# Patient Record
Sex: MALE | Race: WHITE | NOT HISPANIC OR LATINO | Employment: UNEMPLOYED | URBAN - METROPOLITAN AREA
[De-identification: names, ages, dates, MRNs, and addresses within clinical notes are randomized per-mention and may not be internally consistent; named-entity substitution may affect disease eponyms.]

---

## 2019-02-13 ENCOUNTER — HOSPITAL ENCOUNTER (EMERGENCY)
Facility: OTHER | Age: 39
Discharge: HOME | End: 2019-02-14
Attending: EMERGENCY MEDICINE

## 2019-02-13 DIAGNOSIS — R20.8 SENSATION OF CHANGE IN TEMPERATURE: ICD-10-CM

## 2019-02-13 DIAGNOSIS — R05.9 COUGH: ICD-10-CM

## 2019-02-13 DIAGNOSIS — J18.9 PNEUMONIA OF LEFT LOWER LOBE DUE TO INFECTIOUS ORGANISM: Primary | ICD-10-CM

## 2019-02-13 DIAGNOSIS — M79.89 LEG SWELLING: ICD-10-CM

## 2019-02-13 PROCEDURE — 99285 EMERGENCY DEPT VISIT HI MDM: CPT | Mod: 25

## 2019-02-13 RX ORDER — ACETAMINOPHEN 500 MG
1000 TABLET ORAL
Status: DISCONTINUED | OUTPATIENT
Start: 2019-02-14 | End: 2019-02-14 | Stop reason: HOSPADM

## 2019-02-14 VITALS
BODY MASS INDEX: 41.75 KG/M2 | SYSTOLIC BLOOD PRESSURE: 102 MMHG | DIASTOLIC BLOOD PRESSURE: 62 MMHG | HEART RATE: 114 BPM | HEIGHT: 73 IN | WEIGHT: 315 LBS | OXYGEN SATURATION: 99 % | TEMPERATURE: 100 F | RESPIRATION RATE: 18 BRPM

## 2019-02-14 LAB
ALBUMIN SERPL BCP-MCNC: 3.1 G/DL
ALP SERPL-CCNC: 69 U/L
ALT SERPL W/O P-5'-P-CCNC: 11 U/L
ANION GAP SERPL CALC-SCNC: 10 MMOL/L
APTT BLDCRRT: 36.7 SEC
AST SERPL-CCNC: 18 U/L
BASOPHILS # BLD AUTO: 0.02 K/UL
BASOPHILS NFR BLD: 0.2 %
BILIRUB SERPL-MCNC: 1.7 MG/DL
BNP SERPL-MCNC: 16 PG/ML
BUN SERPL-MCNC: 11 MG/DL
CALCIUM SERPL-MCNC: 9.9 MG/DL
CHLORIDE SERPL-SCNC: 98 MMOL/L
CO2 SERPL-SCNC: 26 MMOL/L
CREAT SERPL-MCNC: 1 MG/DL
DIFFERENTIAL METHOD: ABNORMAL
EOSINOPHIL # BLD AUTO: 0 K/UL
EOSINOPHIL NFR BLD: 0.2 %
ERYTHROCYTE [DISTWIDTH] IN BLOOD BY AUTOMATED COUNT: 13.3 %
EST. GFR  (AFRICAN AMERICAN): >60 ML/MIN/1.73 M^2
EST. GFR  (NON AFRICAN AMERICAN): >60 ML/MIN/1.73 M^2
GLUCOSE SERPL-MCNC: 101 MG/DL
HCT VFR BLD AUTO: 41.8 %
HGB BLD-MCNC: 14.2 G/DL
INR PPP: 0.9
LYMPHOCYTES # BLD AUTO: 1.7 K/UL
LYMPHOCYTES NFR BLD: 15 %
MCH RBC QN AUTO: 29.4 PG
MCHC RBC AUTO-ENTMCNC: 34 G/DL
MCV RBC AUTO: 87 FL
MONOCYTES # BLD AUTO: 1.2 K/UL
MONOCYTES NFR BLD: 10.5 %
NEUTROPHILS # BLD AUTO: 8.5 K/UL
NEUTROPHILS NFR BLD: 73.8 %
PLATELET # BLD AUTO: 330 K/UL
PMV BLD AUTO: 9.8 FL
POTASSIUM SERPL-SCNC: 4.1 MMOL/L
PROT SERPL-MCNC: 8.7 G/DL
PROTHROMBIN TIME: 10.5 SEC
RBC # BLD AUTO: 4.83 M/UL
SODIUM SERPL-SCNC: 134 MMOL/L
WBC # BLD AUTO: 11.47 K/UL

## 2019-02-14 PROCEDURE — 85610 PROTHROMBIN TIME: CPT

## 2019-02-14 PROCEDURE — 63600175 PHARM REV CODE 636 W HCPCS: Performed by: PHYSICIAN ASSISTANT

## 2019-02-14 PROCEDURE — 85025 COMPLETE CBC W/AUTO DIFF WBC: CPT

## 2019-02-14 PROCEDURE — 25000003 PHARM REV CODE 250: Performed by: EMERGENCY MEDICINE

## 2019-02-14 PROCEDURE — 83880 ASSAY OF NATRIURETIC PEPTIDE: CPT

## 2019-02-14 PROCEDURE — 80053 COMPREHEN METABOLIC PANEL: CPT

## 2019-02-14 PROCEDURE — 85730 THROMBOPLASTIN TIME PARTIAL: CPT

## 2019-02-14 RX ORDER — BENZONATATE 100 MG/1
100 CAPSULE ORAL 3 TIMES DAILY PRN
Qty: 15 CAPSULE | Refills: 0 | Status: SHIPPED | OUTPATIENT
Start: 2019-02-14 | End: 2019-02-24

## 2019-02-14 RX ORDER — NAPROXEN 500 MG/1
500 TABLET ORAL
Status: COMPLETED | OUTPATIENT
Start: 2019-02-14 | End: 2019-02-14

## 2019-02-14 RX ORDER — SODIUM CHLORIDE 9 MG/ML
1000 INJECTION, SOLUTION INTRAVENOUS
Status: DISCONTINUED | OUTPATIENT
Start: 2019-02-14 | End: 2019-02-14 | Stop reason: HOSPADM

## 2019-02-14 RX ORDER — NAPROXEN 500 MG/1
500 TABLET ORAL 2 TIMES DAILY PRN
Qty: 60 TABLET | Refills: 0 | Status: SHIPPED | OUTPATIENT
Start: 2019-02-14

## 2019-02-14 RX ORDER — LEVOFLOXACIN 750 MG/1
750 TABLET ORAL
Status: COMPLETED | OUTPATIENT
Start: 2019-02-14 | End: 2019-02-14

## 2019-02-14 RX ORDER — LEVOFLOXACIN 750 MG/1
750 TABLET ORAL DAILY
Qty: 4 TABLET | Refills: 0 | Status: SHIPPED | OUTPATIENT
Start: 2019-02-14

## 2019-02-14 RX ADMIN — NAPROXEN 500 MG: 500 TABLET ORAL at 04:02

## 2019-02-14 RX ADMIN — LEVOFLOXACIN 750 MG: 750 TABLET, FILM COATED ORAL at 02:02

## 2019-02-14 NOTE — ED NOTES
"Pt presented to ED via [facility] with complaints of productive cough with bilateral lower extremity swelling/pain for "several weeks" pta. Pt is currently homeless and is wheelchair bound secondary to poor peripheral circulation to lower extremities. On arrival pt appears calm and cooperative RR easy non laboerd, NAD. AAOx4, VSS with mild tachycardia noted on monitor. Continuous blood pressure and pulse ox applied at this time. Negative further complaints at this time.    "

## 2019-02-14 NOTE — ED NOTES
"Pt becomes upset during discharge stating "man yall cant throw me out I would never make it out there without a cab man". Pt request to speak to House supervisor at this time. Charge nurse aware   "

## 2019-02-14 NOTE — ED NOTES
Unable to establish IV access via multiple attempts/multiple RN's. Pt refuses any further attempts at this time. MD aware of current status

## 2019-02-14 NOTE — ED NOTES
Pt resting in recliner without distress, RR easy non labored, NAD. AAOx4, VSS. Negative further complaints at this time. Will continue to monitor and assess for changes.

## 2019-02-14 NOTE — ED NOTES
Rounding on the patient has been done. The patient has been updated on the plan of care and current status. Pain was assessed and is currently a 8/10. Comfort positioning and restroom needs were addressed. Necessary items were placed with in reach and was advised when a reassessment would take place. The call bell remains at the bedside for any additional patient needs. The patient is resting comfortably on the stretcher, respirations are even and unlabored, skin warm and dry. Will continue to monitor.

## 2019-02-14 NOTE — ED NOTES
Pt resting in recliner comfortably, RR easy non labored, NAD. AAOx4, VSS, negative further complaints at this time, will continue to monitor and assess for changes.

## 2019-02-14 NOTE — ED PROVIDER NOTES
Encounter Date: 2/13/2019    SCRIBE #1 NOTE: I, Jenifer Kline, am scribing for, and in the presence of, Dr. Archer.       History     Chief Complaint   Patient presents with    Cough     productive cough for several weeks and L shoulder pain.      Patient is a 39-year-old male with history of hepatitis-C, bronchitis, atrial fibrillation, a.m. poor arterial circulation who is wheelchair-bound (secondary to remote spinal abscess) who presents to the ED via EMS with cough and leg swelling.  Patient reports a productive cough with yellow sputum for the past 2-3 weeks.  He also reports shortness of breath but denies chest pain. He denies fever, chills, or night sweats. Patient also reports lower extremity swelling for the past several weeks.  Patient states he has a history of swelling but it has worsened.  Patient states he has a history of DVT to his left lower extremity in history of cellulitis to bilateral lower extremities. He states he has poor hygiene and is homeless.  He states he has not worn shoes in a while.  He also reports source to his feet.      The history is provided by the patient.     Review of patient's allergies indicates:  No Known Allergies  Past Medical History:   Diagnosis Date    A-fib     Bronchitis     Hepatitis C     Poor peripheral circulation      History reviewed. No pertinent surgical history.  History reviewed. No pertinent family history.  Social History     Tobacco Use    Smoking status: Current Every Day Smoker   Substance Use Topics    Alcohol use: Yes    Drug use: Yes     Types: Methamphetamines     Review of Systems   Constitutional: Negative for chills and fever.   HENT: Negative for congestion and sore throat.    Eyes: Negative for pain.   Respiratory: Positive for cough and shortness of breath.    Cardiovascular: Positive for leg swelling. Negative for chest pain.   Gastrointestinal: Negative for abdominal pain, diarrhea, nausea and vomiting.   Genitourinary: Negative for  dysuria.   Musculoskeletal: Negative for arthralgias.   Skin: Positive for wound (To bilateral feet). Negative for rash.   Neurological: Negative for headaches.       Physical Exam     Initial Vitals [02/13/19 2206]   BP Pulse Resp Temp SpO2   (!) 144/91 (!) 112 18 99.7 °F (37.6 °C) 97 %      MAP       --         Physical Exam    Constitutional: Vital signs are normal. He is cooperative.   Patient is wheelchair-bound who is disheveled and has poor hygiene.   HENT:   Head: Normocephalic and atraumatic.   Eyes: EOM are normal. Pupils are equal, round, and reactive to light.   Neck: Normal range of motion. Neck supple.   Cardiovascular: Normal rate and regular rhythm.   Pedal pulses not palpable.   Pulmonary/Chest: Breath sounds normal. He has no wheezes. He has no rhonchi. He has no rales.   Dry cough noted on exam   Abdominal: Soft. Bowel sounds are normal. There is no tenderness.   Musculoskeletal:   Edematous upper and lower extremities. No pitting edema noted.   Right foot is cool to touch.  Left foot has erythema and warmth near the base of the left great toe.   Neurological: He is alert and oriented to person, place, and time. GCS eye subscore is 4. GCS verbal subscore is 5. GCS motor subscore is 6.   Skin: Skin is warm and dry. Capillary refill takes less than 2 seconds.   There are hyperpigmented sores to the dorsal aspect of bilateral feet that appear chronic.  Bilateral feet are dry.         ED Course   Procedures  Labs Reviewed   CBC W/ AUTO DIFFERENTIAL - Abnormal; Notable for the following components:       Result Value    Gran # (ANC) 8.5 (*)     Mono # 1.2 (*)     Gran% 73.8 (*)     Lymph% 15.0 (*)     All other components within normal limits   COMPREHENSIVE METABOLIC PANEL - Abnormal; Notable for the following components:    Sodium 134 (*)     Total Protein 8.7 (*)     Albumin 3.1 (*)     Total Bilirubin 1.7 (*)     All other components within normal limits   APTT - Abnormal; Notable for the following  components:    aPTT 36.7 (*)     All other components within normal limits   PROTIME-INR   B-TYPE NATRIURETIC PEPTIDE   LACTIC ACID, PLASMA          Imaging Results          US Lower Extremity Arteries Bilateral (Final result)  Result time 02/14/19 01:41:33    Final result by Judi Hernandez MD (02/14/19 01:41:33)                 Impression:      Nonspecific monophasic waveforms seen throughout the bilateral lower extremities.  No evidence of hemodynamically significant arterial stenosis or occlusion.      Electronically signed by: Judi Hernandez MD  Date:    02/14/2019  Time:    01:41             Narrative:    EXAMINATION:  US LOWER EXTREMITY ARTERIES BILATERAL    CLINICAL HISTORY:  Other general symptoms and signs    TECHNIQUE:  Bilateral ankle-brachial indices as well as bilateral spectral, color and grayscale images of the large arteries of both lower extremities were performed.    COMPARISON:  None    FINDINGS:  Right lower extremity:    Common femoral artery: 202 cm/sec, triphasic    SFA proximal: 199 cm/sec, monophasic    SFA mid: 200 cm/sec, monophasic    SFA distal: 230 cm/sec, monophasic    Popliteal artery: 75 cm/sec, monophasic    Peroneal artery: 110 cm/sec    Posterior tibial artery: 78 cm/sec    Anterior tibial artery: 132 cm/sec    Dorsalis pedis artery: 149 cm/sec    Left lower extremity:    Common femoral artery: 121 cm/sec, triphasic    SFA proximal: 161 cm/sec, monophasic    SFA mid: 178 cm/sec, monophasic    SFA distal: 167 cm/sec, monophasic    Popliteal artery: 100 cm/sec, monophasic    Peroneal artery: 93 cm/sec    Posterior tibial artery: 47 cm/sec    Anterior tibial artery: 75 cm/sec    Dorsalis pedis artery: 121 cm/sec                               US Lower Extremity Veins Bilateral (Final result)  Result time 02/14/19 01:28:05    Final result by Judi Hernandez MD (02/14/19 01:28:05)                 Impression:      No evidence of lower extremity deep venous  thrombosis.      Electronically signed by: Judi Hernandez MD  Date:    02/14/2019  Time:    01:28             Narrative:    EXAMINATION:  US LOWER EXTREMITY VEINS BILATERAL    CLINICAL HISTORY:  Other specified soft tissue disorders    TECHNIQUE:  Duplex and color flow Doppler evaluation of the bilateral lower extremity veins was performed.    COMPARISON:  None    FINDINGS:  No evidence of clot involving the bilateral common femoral, greater saphenous, femoral, popliteal, peroneal, anterior and posterior tibial veins.  All venous structures demonstrate normal respiratory phasicity and augment adequately.  No evidence of soft tissue mass or Baker's cyst.                               X-Ray Chest PA And Lateral (Final result)  Result time 02/14/19 00:32:27   Procedure changed from X-Ray Chest 1 View     Final result by Judi Hernandez MD (02/14/19 00:32:27)                 Impression:      Left lower lobe consolidation, suspected pneumonia.  Future radiographic follow-up is recommended to ensure resolution.      Electronically signed by: Judi Hernandez MD  Date:    02/14/2019  Time:    00:32             Narrative:    EXAMINATION:  XR CHEST PA AND LATERAL    CLINICAL HISTORY:  cough; Cough    TECHNIQUE:  PA and lateral views of the chest were performed.    COMPARISON:  23:23.    FINDINGS:  Heart is normal in size.  Lungs are symmetrically expanded.  Consolidative changes are seen at the left lung base.  No evidence of pneumothorax or significant effusion.  No acute osseous abnormality identified.                               X-Ray Chest 1 View (Final result)  Result time 02/13/19 23:43:52   Procedure changed from X-Ray Chest PA And Lateral     Final result by Judi Hernandez MD (02/13/19 23:43:52)                 Impression:      Consolidative changes seen within the left lower lung zone, possible pneumonia.  Future follow-up is recommended to ensure resolution.    Questionable pneumothorax versus overlying skin  fold seen in the left apical region.  Suboptimal portable technique.  Recommend chest PA and lateral versus CT chest if unable to be performed.      Electronically signed by: Judi Hernandez MD  Date:    02/13/2019  Time:    23:43             Narrative:    EXAMINATION:  XR CHEST 1 VIEW    CLINICAL HISTORY:  cough; Cough    TECHNIQUE:  Single frontal view of the chest was performed.    COMPARISON:  None    FINDINGS:  Examination limited by relative underpenetration and portable technique.  Consolidative changes are seen within the left lower lung zone.  Heart is normal in size.  Questionable left-sided pneumothorax versus overlying skin fold is seen in the apical region on the left.  No acute osseous abnormality identified.                                 Medical Decision Making:   Initial Assessment:   Urgent evaluation of a 39 y.o. male presenting to the emergency department complaining of leg swelling, shortness of breath and cough. Patient is afebrile, nontoxic appearing and hemodynamically stable. Initial vital signs revealed mild tachycardia.  Patient has chronic leg swelling but states that is worse with new sores.  Patient has a cool temperature noted to right foot a and an area of warmth noted to the left foot near the great toe (possible cellulitis infxn).  Patient is homeless and has poor hygiene.  Appears that this is a chronic issue when looking back on medical records.  Does not appear to be gangrene or limb threatening infection.   Pedal pulses were obtain by ultrasound.  Will obtain labs, chest x-ray, and venous and arterial ultrasound.  ED Management:  Facial chest x-ray reveal consolidative changes seen within the left lower lung zone.  There was a questionable pneumothorax versus overlying skin full to the left apical region.  Repeat chest x-rays obtain with repositioning that revealed no evidence of pneumothorax with suspected pneumonia left lower lobe.  Will initiate Levaquin.  CBC reveals a  leukocytosis.  Chemistry reveals mild hyponatremia of 134.  Total bilirubin is elevated 1.7.  Patient has low albumin likely from poor nourishment.  BNP is within normal limits. APtT is elevated at 36.7 and PT INR is normal.  Will sign over care to Dr. Archer, pending results of arterial and venous ultrasound.    Update (3:25 AM): Venus and doppler ultrasounds are negative for acute DVT or other severe abnormality. Patient with continued mild tachycardia. He has history of IV drug use. Many attempts were made at IV placement. These were not successful and the patient refused further attempts.              Scribe Attestation:   Scribe #1: I performed the above scribed service and the documentation accurately describes the services I performed. I attest to the accuracy of the note.    Attending Attestation:           Physician Attestation for Scribe:  Physician Attestation Statement for Scribe #1: I, Dr. Archer, reviewed documentation, as scribed by Jenifer Kline in my presence, and it is both accurate and complete.                 ED Course as of Feb 14 0414   Thu Feb 14, 2019   0404 Physician Attestation Statement: I have reviewed this case with my non-physician provider.  Physician Attestation Statement: I have provided a face to face evaluation of this patient at the request of my  non-physician provider.  Physician Attestation Statement: The patient's condition warranted physician involvement. The treatment regimen was reviewed by me.   Patient is a 39-year-old male with hepatitis C, peripheral vascular disease, paroxysmal atrial fibrillation who presents with several weeks cough, lower extremity edema, leg pain.  Arterial and venous ultrasounds show no acute process.  Patient's labs unremarkable and unrevealing.  Patient refused further IV placement at times, continued evaluation.  This is likely due to some degree of dehydration is the patient admits to not eating in 2 days, and limited oral intake.  He tolerated  oral fluids here.   The patient displays normal decision making capacity. I explained to the patient our concern for underlying dehydration, or that his tachycardia may be due to a more severe etiology including sepsis. I explained that the risks of declining IV placement at this time include missed/delayed diagnosis, worsening of condition, bleeding, infection, permanent disability, MI, coma, and death.   Furthermore, I offered the patient alternatives to the preferred evaluation and treatment plan, specifically continued oral hydration, strict return precautions, starting oral antibiotics for his pneumonia, possible cellulitis. The risks and benefits of these alternatives were discussed.   All the patient's questions were answered.  Nonetheless, the patient persists in refusing IV placement at this time.  The patient was encouraged to return at any point for continued, new, or concerning symptoms.   [RC]      ED Course User Index  [RC] Ranjith Archer MD     Clinical Impression:     1. Pneumonia of left lower lobe due to infectious organism    2. Cough    3. Leg swelling    4. Sensation of change in temperature                                 Ranjith Archer MD  02/14/19 0414

## 2019-02-14 NOTE — DISCHARGE INSTRUCTIONS
Call your primary care doctor to make the first available appointment.     Keep all your medical appointments.     Take your regular medication as prescribed. Contact your primary care provider before running out of medication, or for any problems obtaining them.    Do not drive or operate heavy machinery while taking opioid or muscle relaxing medications. Examples include norco, percocet, xanax, valium, flexeril.     Overuse or long term use of pain and sedating medication may lead to addiction, dependence, organ failure, family and work problems, legal problems, accidental overdose and death.    If you do not have health insurance, you probably qualify for heavily discounted rates:  Call 1-351.917.3668 ([facility] hotline) or go to www.[place].gov    Your evaluation in the ED does not suggest any emergent or life threatening medical condition requiring admission or immediate intervention beyond that provided in the ED.   However, the signs of a serious problem sometimes take more time to appear.   RETURN TO THE ER if any of the following occur:    Weakness, dizziness, fainting, or loss of consciousness   Fever of 100.4ºF (38ºC) or higher  Any worse symptoms  Any new or concerning symptoms